# Patient Record
Sex: MALE | Race: WHITE | Employment: STUDENT | URBAN - METROPOLITAN AREA
[De-identification: names, ages, dates, MRNs, and addresses within clinical notes are randomized per-mention and may not be internally consistent; named-entity substitution may affect disease eponyms.]

---
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
## 2023-01-13 ENCOUNTER — OFFICE VISIT (OUTPATIENT)
Dept: FAMILY MEDICINE CLINIC | Facility: CLINIC | Age: 18
End: 2023-01-13
Payer: COMMERCIAL

## 2023-01-13 VITALS
TEMPERATURE: 98 F | OXYGEN SATURATION: 97 % | RESPIRATION RATE: 16 BRPM | BODY MASS INDEX: 17 KG/M2 | SYSTOLIC BLOOD PRESSURE: 108 MMHG | WEIGHT: 131.38 LBS | HEART RATE: 78 BPM | DIASTOLIC BLOOD PRESSURE: 70 MMHG

## 2023-01-13 DIAGNOSIS — Z11.3 SCREENING EXAMINATION FOR STD (SEXUALLY TRANSMITTED DISEASE): Primary | ICD-10-CM

## 2023-01-13 PROCEDURE — 87591 N.GONORRHOEAE DNA AMP PROB: CPT | Performed by: FAMILY MEDICINE

## 2023-01-13 PROCEDURE — 87491 CHLMYD TRACH DNA AMP PROBE: CPT | Performed by: FAMILY MEDICINE

## 2023-01-13 PROCEDURE — 86592 SYPHILIS TEST NON-TREP QUAL: CPT | Performed by: FAMILY MEDICINE

## 2023-01-13 PROCEDURE — 87389 HIV-1 AG W/HIV-1&-2 AB AG IA: CPT | Performed by: FAMILY MEDICINE

## 2023-01-13 RX ORDER — CLINDAMYCIN AND BENZOYL PEROXIDE 10; 50 MG/G; MG/G
GEL TOPICAL
COMMUNITY
Start: 2022-12-08

## 2023-01-14 LAB — RPR SER QL: NONREACTIVE

## 2023-01-16 LAB
C TRACH DNA SPEC QL NAA+PROBE: NEGATIVE
N GONORRHOEA DNA SPEC QL NAA+PROBE: NEGATIVE

## 2025-07-28 ENCOUNTER — OFFICE VISIT (OUTPATIENT)
Dept: FAMILY MEDICINE CLINIC | Facility: CLINIC | Age: 20
End: 2025-07-28
Payer: COMMERCIAL

## 2025-07-28 VITALS
HEART RATE: 63 BPM | BODY MASS INDEX: 17.97 KG/M2 | DIASTOLIC BLOOD PRESSURE: 60 MMHG | OXYGEN SATURATION: 98 % | RESPIRATION RATE: 18 BRPM | WEIGHT: 140 LBS | SYSTOLIC BLOOD PRESSURE: 102 MMHG | HEIGHT: 74 IN | TEMPERATURE: 98 F

## 2025-07-28 DIAGNOSIS — H61.21 IMPACTED CERUMEN OF RIGHT EAR: Primary | ICD-10-CM

## 2025-07-28 PROCEDURE — 3078F DIAST BP <80 MM HG: CPT | Performed by: NURSE PRACTITIONER

## 2025-07-28 PROCEDURE — 3008F BODY MASS INDEX DOCD: CPT | Performed by: NURSE PRACTITIONER

## 2025-07-28 PROCEDURE — 69209 REMOVE IMPACTED EAR WAX UNI: CPT | Performed by: NURSE PRACTITIONER

## 2025-07-28 PROCEDURE — 3074F SYST BP LT 130 MM HG: CPT | Performed by: NURSE PRACTITIONER

## 2025-07-28 NOTE — PROGRESS NOTES
Patient presents with:    Request for Ear Wax Removal    HPI: Patient presents for removal of ear wax. Has ongoing issue with cerumen build up. Home treatments tried: debrox ear drops. Reports diminished hearing, itching.  Denies ear pain, fever, chills.    Cerumen Removal Procedure  Patient gave verbal consent.   Risks and Benefits of removal were discussed with the patient. Pt agreed to proceed with procedure.    Location: Right ear  Indication: TM not visible, local itching, fullness.  Prep: Hydrogen Peroxide with warm water via syringe  Removal: Otoscope visualization of cerumen and ear lavage were used to remove the wax  Patient Status: Tolerated well; No complications      EXAM: Prior to cerumen removal:  right EAC with cerumen impaction; right TM not visible.  No tragal tenderness on palpation.  After removal: EACs healthy and without lesions. TMs healthy, no injection, fluid, retraction.     Assessment:  Cerumen impaction of right ear    Plan: Successful removal with ear lavage. Patient tolerated without complications.    Discussed prevention of impaction.     F/U as needed.

## (undated) NOTE — LETTER
2019      RE: Jonathan Chaves  : 2005      To Whom it May Concern,      Jonathan Chaves was seen in office 2019. Please excuse Jonathan Chaves from any school missed on this date.         Traci Meade MD

## (undated) NOTE — LETTER
11/15/2019      RE: Carleen Russell  : 2005      To Whom it May Concern,      Carleen Russell was seen in office 11/15/2019 for an illness. Please excuse Carleen Russell from basketball related activities in order to recuperate from his illness.

## (undated) NOTE — LETTER
Patient Name: Yayo Dawkins  : 2005  MRN: [id number]  Patient Address: [address] [facility] [address]      Coronavirus Disease 2019 (COVID-19)     [place] [address] is committed to the safety and well-being of our patients, members, carefully. If your symptoms get worse, call your healthcare provider immediately. 3. Get rest and stay hydrated.    4. If you have a medical appointment, call the healthcare provider ahead of time and tell them that you have or may have COVID-19.  5. For m of fever-reducing medications; and  · Improvement in respiratory symptoms (e.g., cough, shortness of breath); and  · At least 10 days have passed since symptoms first appeared OR if asymptomatic patient or date of symptom onset is unclear then use 10 days donors must:    · Have had a confirmed diagnosis of COVID-19  · Be symptom-free for at least 14 days*    *Some people will be required to have a repeat COVID-19 test in order to be eligible to donate.  If you’re instructed by [facility] that a repeat test is r

## (undated) NOTE — LETTER
2022      RE: Beto Hopson  : 2005      To Whom it May Concern,      Beto Hopson is a patient of mine. He suffers migraines which can be triggered by heat exposure. I recommend he have an air conditioned dorm room to prevent migraine attacks.         Deidra Dixon MD